# Patient Record
Sex: MALE
[De-identification: names, ages, dates, MRNs, and addresses within clinical notes are randomized per-mention and may not be internally consistent; named-entity substitution may affect disease eponyms.]

---

## 2024-05-30 ENCOUNTER — APPOINTMENT (OUTPATIENT)
Dept: ORTHOPEDIC SURGERY | Facility: CLINIC | Age: 59
End: 2024-05-30
Payer: COMMERCIAL

## 2024-05-30 DIAGNOSIS — M25.562 PAIN IN LEFT KNEE: ICD-10-CM

## 2024-05-30 PROBLEM — Z00.00 ENCOUNTER FOR PREVENTIVE HEALTH EXAMINATION: Status: ACTIVE | Noted: 2024-05-30

## 2024-05-30 PROCEDURE — 73562 X-RAY EXAM OF KNEE 3: CPT | Mod: 50

## 2024-05-30 PROCEDURE — 99203 OFFICE O/P NEW LOW 30 MIN: CPT

## 2024-05-30 RX ORDER — MELOXICAM 7.5 MG/1
7.5 TABLET ORAL
Qty: 30 | Refills: 0 | Status: ACTIVE | COMMUNITY
Start: 2024-05-30 | End: 1900-01-01

## 2024-05-30 NOTE — DISCUSSION/SUMMARY
[de-identified] : Posterior left knee pain  HPI Patient is a 58-year-old male accompanied by his wife who reports to the office for evaluation of his posterior left knee pain that has been aggravating him for the past several weeks.  Denies any recent trauma or injury to the area.  Getting up from a seated position, certain range of motion aggravate the patient's pain at times.  Denies any numbness or tingling.  Bilateral knee x-rays taken in office today reveal no obvious fractures, subluxations, or dislocations.  Mild degenerative changes noted.  Otherwise, no other significant abnormalities were seen.  Left knee exam is as follows: Minimal effusion noted.  No erythema or ecchymosis.  Small palpable Baker's cyst noted on posterior aspect of left knee that is mildly tender to palpation.  Medial joint line tenderness to palpation.  Able to perform active straight leg raise.  Knee flexion from 0 to 120 degrees.  Calf is soft and nontender.  Light touch intact throughout.  Mildly antalgic gait.  Assessment/plan Explained mild degenerative change on x-ray and likely palpable Baker's cyst.  Left knee MRI ordered for further evaluation.  Patient was advised to call the office a few days after getting the MRI done to discuss results over the phone.  Mobic 7.5 mg PO QD PRN was sent to patient's pharmacy to help alleviate their symptoms.  Advised patient to monitor his blood pressure while taking this medication.  A script for physical therapy was printed for the patient so they can get started on that.  We will hold off on cortisone injection now as patient is unsure if he is diabetic.  Advised lab work with his PCP prior to next visit.  Follow-up in 2 to 3 weeks.  All questions/concerns were answered in detail.

## 2024-06-14 ENCOUNTER — APPOINTMENT (OUTPATIENT)
Dept: ORTHOPEDIC SURGERY | Facility: CLINIC | Age: 59
End: 2024-06-14

## 2024-06-16 ENCOUNTER — TRANSCRIPTION ENCOUNTER (OUTPATIENT)
Age: 59
End: 2024-06-16